# Patient Record
Sex: FEMALE | Race: WHITE | NOT HISPANIC OR LATINO | Employment: FULL TIME | URBAN - METROPOLITAN AREA
[De-identification: names, ages, dates, MRNs, and addresses within clinical notes are randomized per-mention and may not be internally consistent; named-entity substitution may affect disease eponyms.]

---

## 2020-10-21 PROBLEM — R73.9 HYPERGLYCEMIA: Status: ACTIVE | Noted: 2020-10-21

## 2020-10-21 PROBLEM — L65.9 HAIR LOSS: Status: ACTIVE | Noted: 2020-10-21

## 2020-10-21 PROBLEM — G62.9 NEUROPATHY: Status: ACTIVE | Noted: 2020-10-21

## 2021-05-21 PROBLEM — K21.9 GASTROESOPHAGEAL REFLUX DISEASE: Status: ACTIVE | Noted: 2021-05-21

## 2021-05-21 PROBLEM — E78.5 DYSLIPIDEMIA: Status: ACTIVE | Noted: 2021-05-21

## 2021-09-21 PROBLEM — U07.1 COVID-19: Status: ACTIVE | Noted: 2021-09-21

## 2022-07-08 ENCOUNTER — TELEPHONE (OUTPATIENT)
Dept: HEALTH INFORMATION MANAGEMENT | Facility: OTHER | Age: 67
End: 2022-07-08

## 2022-07-08 NOTE — TELEPHONE ENCOUNTER
Denice has an upcoming appointment with Dr. Gu on 07/21/22 for routine appointment. Denice was ok with me adding to her upcoming notes that she is interested in the AWV and to go over any care gaps. Denice also wants to be checked to see if she still carries the Covid antibody from last year when she was positive. Jessica did not want to schedule her AWV but only discuss it with her provider. HIPAA verified.